# Patient Record
Sex: MALE | Race: WHITE | NOT HISPANIC OR LATINO | Employment: OTHER | URBAN - NONMETROPOLITAN AREA
[De-identification: names, ages, dates, MRNs, and addresses within clinical notes are randomized per-mention and may not be internally consistent; named-entity substitution may affect disease eponyms.]

---

## 2024-10-11 PROBLEM — H11.051: Status: ACTIVE | Noted: 2024-10-11

## 2024-10-14 ENCOUNTER — ANESTHESIA EVENT (OUTPATIENT)
Dept: SURGERY | Facility: HOSPITAL | Age: 69
End: 2024-10-14
Payer: MEDICARE

## 2024-10-14 RX ORDER — AZELASTINE 1 MG/ML
1 SPRAY, METERED NASAL 2 TIMES DAILY
COMMUNITY

## 2024-10-14 RX ORDER — MONTELUKAST SODIUM 10 MG/1
10 TABLET ORAL NIGHTLY
COMMUNITY

## 2024-10-14 RX ORDER — ASPIRIN 81 MG/1
81 TABLET ORAL DAILY
COMMUNITY

## 2024-10-14 RX ORDER — NEBIVOLOL 5 MG/1
10 TABLET ORAL DAILY
COMMUNITY

## 2024-10-14 RX ORDER — LISINOPRIL 10 MG/1
10 TABLET ORAL DAILY
COMMUNITY

## 2024-10-14 RX ORDER — ATORVASTATIN CALCIUM 40 MG/1
40 TABLET, FILM COATED ORAL DAILY
COMMUNITY

## 2024-10-14 NOTE — ANESTHESIA PREPROCEDURE EVALUATION
10/14/2024  Luisito Bell is a 68 y.o., male.      Pre-op Assessment    I have reviewed the Patient Summary Reports.    I have reviewed the NPO Status.   I have reviewed the Medications.     Review of Systems  Anesthesia Hx:  No problems with previous Anesthesia             Denies Family Hx of Anesthesia complications.    Denies Personal Hx of Anesthesia complications.                    Social:  Non-Smoker       Cardiovascular:     Hypertension, well controlled                                          Pulmonary:  Pulmonary Normal                       Renal/:  Renal/ Normal                 Hepatic/GI:  Hepatic/GI Normal                    Neurological:  Neurology Normal                                      Endocrine:  Endocrine Normal                Physical Exam  General: Well nourished    Airway:  Mallampati: II / II  Mouth Opening: Normal  TM Distance: Normal  Tongue: Normal  Neck ROM: Normal ROM    Dental:  Partial Dentures    Chest/Lungs:  Clear to auscultation    Heart:  Rate: Normal  Rhythm: Regular Rhythm  Sounds: Normal        Anesthesia Plan  Type of Anesthesia, risks & benefits discussed:    Anesthesia Type: MAC  Intra-op Monitoring Plan: Standard ASA Monitors  Post Op Pain Control Plan: multimodal analgesia  Induction:  IV  Airway Plan: Direct  Informed Consent: Informed consent signed with the Patient and all parties understand the risks and agree with anesthesia plan.  All questions answered.   ASA Score: 2  Day of Surgery Review of History & Physical: I have interviewed and examined the patient. I have reviewed the patient's H&P dated: There are no significant changes.     Ready For Surgery From Anesthesia Perspective.     .

## 2024-10-15 ENCOUNTER — ANESTHESIA (OUTPATIENT)
Dept: SURGERY | Facility: HOSPITAL | Age: 69
End: 2024-10-15
Payer: MEDICARE

## 2024-10-15 ENCOUNTER — HOSPITAL ENCOUNTER (OUTPATIENT)
Facility: HOSPITAL | Age: 69
Discharge: HOME | End: 2024-10-15
Attending: OPHTHALMOLOGY | Admitting: OPHTHALMOLOGY
Payer: MEDICARE

## 2024-10-15 VITALS
HEART RATE: 70 BPM | HEIGHT: 67 IN | OXYGEN SATURATION: 100 % | BODY MASS INDEX: 27.94 KG/M2 | WEIGHT: 178 LBS | RESPIRATION RATE: 18 BRPM | TEMPERATURE: 98 F | SYSTOLIC BLOOD PRESSURE: 140 MMHG | DIASTOLIC BLOOD PRESSURE: 81 MMHG

## 2024-10-15 DIAGNOSIS — H11.051 PROGRESSIVE PERIPHERAL PTERYGIUM OF RIGHT EYE: ICD-10-CM

## 2024-10-15 PROCEDURE — V2790 AMNIOTIC MEMBRANE: HCPCS | Performed by: OPHTHALMOLOGY

## 2024-10-15 PROCEDURE — 36000707: Performed by: OPHTHALMOLOGY

## 2024-10-15 PROCEDURE — 63600175 PHARM REV CODE 636 W HCPCS: Performed by: NURSE ANESTHETIST, CERTIFIED REGISTERED

## 2024-10-15 PROCEDURE — 25000003 PHARM REV CODE 250: Performed by: OPHTHALMOLOGY

## 2024-10-15 PROCEDURE — 63600175 PHARM REV CODE 636 W HCPCS: Performed by: OPHTHALMOLOGY

## 2024-10-15 PROCEDURE — 36000706: Performed by: OPHTHALMOLOGY

## 2024-10-15 PROCEDURE — 37000009 HC ANESTHESIA EA ADD 15 MINS: Performed by: OPHTHALMOLOGY

## 2024-10-15 PROCEDURE — 25000242 PHARM REV CODE 250 ALT 637 W/ HCPCS: Performed by: ANESTHESIOLOGY

## 2024-10-15 PROCEDURE — 71000015 HC POSTOP RECOV 1ST HR: Performed by: OPHTHALMOLOGY

## 2024-10-15 PROCEDURE — 27201423 OPTIME MED/SURG SUP & DEVICES STERILE SUPPLY: Performed by: OPHTHALMOLOGY

## 2024-10-15 PROCEDURE — 37000008 HC ANESTHESIA 1ST 15 MINUTES: Performed by: OPHTHALMOLOGY

## 2024-10-15 PROCEDURE — 25000003 PHARM REV CODE 250

## 2024-10-15 DEVICE — GRAFT AMNIOGRAFT 2X1.5CM: Type: IMPLANTABLE DEVICE | Site: EYE | Status: FUNCTIONAL

## 2024-10-15 RX ORDER — LIDOCAINE HCL/EPINEPHRINE/PF 2%-1:200K
VIAL (ML) INJECTION
Status: DISCONTINUED | OUTPATIENT
Start: 2024-10-15 | End: 2024-10-15 | Stop reason: HOSPADM

## 2024-10-15 RX ORDER — MOXIFLOXACIN 5 MG/ML
1 SOLUTION/ DROPS OPHTHALMIC
Status: COMPLETED | OUTPATIENT
Start: 2024-10-15 | End: 2024-10-15

## 2024-10-15 RX ORDER — PREDNISOLONE ACETATE 10 MG/ML
1 SUSPENSION/ DROPS OPHTHALMIC ONCE
Status: DISCONTINUED | OUTPATIENT
Start: 2024-10-15 | End: 2024-10-15 | Stop reason: HOSPADM

## 2024-10-15 RX ORDER — LIDOCAINE HYDROCHLORIDE 10 MG/ML
1 INJECTION, SOLUTION EPIDURAL; INFILTRATION; INTRACAUDAL; PERINEURAL ONCE
Status: DISCONTINUED | OUTPATIENT
Start: 2024-10-15 | End: 2024-10-15 | Stop reason: HOSPADM

## 2024-10-15 RX ORDER — TETRACAINE HYDROCHLORIDE 5 MG/ML
SOLUTION OPHTHALMIC
Status: DISCONTINUED | OUTPATIENT
Start: 2024-10-15 | End: 2024-10-15 | Stop reason: HOSPADM

## 2024-10-15 RX ORDER — PROPARACAINE HYDROCHLORIDE 5 MG/ML
SOLUTION/ DROPS OPHTHALMIC
Status: DISCONTINUED | OUTPATIENT
Start: 2024-10-15 | End: 2024-10-15 | Stop reason: HOSPADM

## 2024-10-15 RX ORDER — MOXIFLOXACIN 5 MG/ML
SOLUTION/ DROPS OPHTHALMIC
Status: DISCONTINUED | OUTPATIENT
Start: 2024-10-15 | End: 2024-10-15 | Stop reason: HOSPADM

## 2024-10-15 RX ORDER — NEOMYCIN SULFATE, POLYMYXIN B SULFATE, AND DEXAMETHASONE 3.5; 10000; 1 MG/G; [USP'U]/G; MG/G
OINTMENT OPHTHALMIC
Status: DISCONTINUED | OUTPATIENT
Start: 2024-10-15 | End: 2024-10-15 | Stop reason: HOSPADM

## 2024-10-15 RX ORDER — DEXAMETHASONE SODIUM PHOSPHATE 4 MG/ML
INJECTION, SOLUTION INTRA-ARTICULAR; INTRALESIONAL; INTRAMUSCULAR; INTRAVENOUS; SOFT TISSUE
Status: DISCONTINUED | OUTPATIENT
Start: 2024-10-15 | End: 2024-10-15 | Stop reason: HOSPADM

## 2024-10-15 RX ORDER — DEXAMETHASONE SODIUM PHOSPHATE 4 MG/ML
2 INJECTION, SOLUTION INTRA-ARTICULAR; INTRALESIONAL; INTRAMUSCULAR; INTRAVENOUS; SOFT TISSUE ONCE
Status: DISCONTINUED | OUTPATIENT
Start: 2024-10-15 | End: 2024-10-15 | Stop reason: HOSPADM

## 2024-10-15 RX ORDER — NEOMYCIN SULFATE, POLYMYXIN B SULFATE, AND DEXAMETHASONE 3.5; 10000; 1 MG/G; [USP'U]/G; MG/G
OINTMENT OPHTHALMIC ONCE
Status: DISCONTINUED | OUTPATIENT
Start: 2024-10-15 | End: 2024-10-15 | Stop reason: HOSPADM

## 2024-10-15 RX ORDER — TETRACAINE HYDROCHLORIDE 5 MG/ML
1 SOLUTION OPHTHALMIC
Status: DISPENSED | OUTPATIENT
Start: 2024-10-15 | End: 2024-10-15

## 2024-10-15 RX ORDER — TETRACAINE HYDROCHLORIDE 5 MG/ML
1 SOLUTION OPHTHALMIC
Status: COMPLETED | OUTPATIENT
Start: 2024-10-15 | End: 2024-10-15

## 2024-10-15 RX ORDER — CEFAZOLIN SODIUM 1 G/3ML
INJECTION, POWDER, FOR SOLUTION INTRAMUSCULAR; INTRAVENOUS
Status: DISCONTINUED | OUTPATIENT
Start: 2024-10-15 | End: 2024-10-15 | Stop reason: HOSPADM

## 2024-10-15 RX ORDER — MOXIFLOXACIN 5 MG/ML
1 SOLUTION/ DROPS OPHTHALMIC
Status: ACTIVE | OUTPATIENT
Start: 2024-10-15 | End: 2024-10-15

## 2024-10-15 RX ORDER — PREDNISOLONE ACETATE 10 MG/ML
SUSPENSION/ DROPS OPHTHALMIC
Status: DISCONTINUED | OUTPATIENT
Start: 2024-10-15 | End: 2024-10-15 | Stop reason: HOSPADM

## 2024-10-15 RX ORDER — MIDAZOLAM HYDROCHLORIDE 2 MG/ML
2 SYRUP ORAL ONCE
Status: COMPLETED | OUTPATIENT
Start: 2024-10-15 | End: 2024-10-15

## 2024-10-15 RX ORDER — MIDAZOLAM HYDROCHLORIDE 1 MG/ML
INJECTION INTRAMUSCULAR; INTRAVENOUS
Status: DISCONTINUED | OUTPATIENT
Start: 2024-10-15 | End: 2024-10-15

## 2024-10-15 RX ADMIN — MOXIFLOXACIN 1 DROP: 5 SOLUTION/ DROPS OPHTHALMIC at 11:10

## 2024-10-15 RX ADMIN — TETRACAINE HYDROCHLORIDE 1 DROP: 5 SOLUTION OPHTHALMIC at 11:10

## 2024-10-15 RX ADMIN — TETRACAINE HYDROCHLORIDE 1 DROP: 5 SOLUTION OPHTHALMIC at 12:10

## 2024-10-15 RX ADMIN — MIDAZOLAM HYDROCHLORIDE 2 MG: 2 SYRUP ORAL at 12:10

## 2024-10-15 RX ADMIN — MITOMYCIN 0.2 MG: KIT at 02:10

## 2024-10-15 RX ADMIN — MIDAZOLAM 2 MG: 1 INJECTION INTRAMUSCULAR; INTRAVENOUS at 01:10

## 2024-10-15 NOTE — TRANSFER OF CARE
Anesthesia Transfer of Care Note    Patient: Luisito Bell    Procedure(s) Performed: Procedure(s) (LRB):  EXCISION, PTERYGIUM (Right)    Patient location: Other: OR    Anesthesia Type: MAC    Transport from OR: Transported from OR on room air with adequate spontaneous ventilation    Post pain: adequate analgesia    Post assessment: no apparent anesthetic complications    Post vital signs: stable    Level of consciousness: awake    Nausea/Vomiting: no nausea/vomiting    Complications: none    Transfer of care protocol was followed      Last vitals:   100% O2 SAT  75 HR  162/78  16 RR  37 C TEMP

## 2024-10-15 NOTE — DISCHARGE INSTRUCTIONS
-RELAX AT HOME FOR THE REST OF THE DAY. YOU MAY EAT ANYTHING YOU LIKE.   -PLAN TO SEE DR LOPEZ TOMORROW AT THE OFFICE. BRING ALL EYE DROPS WITH YOU TO THIS APPOINTMENT.    -PLAN TO WEAR EYE SHIELD UNTIL YOUR APPOINTMENT WITH DR LOPEZ    -STARTING THE DAY AFTER SURGERY:    -PUT EYE DROPS/OINTMENT IN THE AFFECTED EYE AS PER DR LOPEZ'S EYE DROP SCHEDULED.     -IF YOU HAVE EXCESSIVE PAIN OR DECREASED VISION, CALL DR LOPEZ'S OFFICE [phone] OR IF AFTER HOURS CALL [phone].

## 2024-10-15 NOTE — OP NOTE
[facility]  Operative Note    Date:  10/15/2024     Patient:  Luisito Bell   MRN:  [id number]   :  1955    Surgeon:  Rian Alexandre MD    PREOPERATIVE DIAGNOSIS:  Visually significant peripheral progressive pterygium, right eye.    POSTOPERATIVE DIAGNOSIS:  Visually significant peripheral progressive pterygium, right eye.    PROCEDURE:  Pterygium removal with Mitomycin C and Amniotic Membrane Transplant, right eye    ANESTHESIA:  Topical, local, MAC.      COMPLICATIONS:  None.    ESTIMATED BLOOD LOSS:  Minimal.    PROCEDURE IN DETAIL:  The patient presented to our clinic complaining of increasing difficulties with activities of daily living due to a visually significant peripheral progressive pterygium in the right eye.  After risks, benefits, and alternatives were explained to the patient, the patient agreed to proceed with the above procedure.  The patient was brought to the operating room and received topical anesthetic to the right eye and then prepped and draped in the usual sterile fashion.  The patient was asked to look the right as the nasal conjunctiva was dried with a Weck-Imelda.  The conjunctival portion of the pterygium was outlined using a sterile marker.  Next, an injection of lidocaine 2% with epinephrine was given subconjunctivally nasally.  0.12 forceps and blunt Matthew scissors were used to dissect out the conjunctival portion of the pterygium.  The corneal portion of the pterygium was then gently pealed off with conjunctival forceps and a dry Weck-Imelda.  The specimen was sent to Pathology.  The nasal cornea was smoothed using a pola emma.  Blunt dissection was performed subconjunctivally and hemostasis was achieved on the episclera using eraser cautery.  3 pledgets soaked in Mitomycin C 0.04% were placed subconjunctivally nasally for 2 minutes exactly, and then removed.  Immediately after this, 3 bottles of BSS were used to thoroughly rinse the area.  Next, the amniotic  membrane was cut to size to fill the conjunctival defect.  The amniotic membrane transplant was placed on the episclera and retracted nasally.  Thrombin and Fibrinogen were placed on the episclera, followed by placement of the amniotic membrane transplant.  The transplant was smoothed using a muscle hook and its edges were tucked subconjunctivally.  An injection of Decadron and Ancef was given subconjunctivally inferiorly.  Vigamox, Pred Forte, as well as Maxitrol ointment were placed in the eye followed by placement of a patch and Holloway shield.  The patient tolerated the procedure well and was taken to the recovery room in good and stable condition.  The patient was instructed to keep the patch and shield on at all times and to follow-up in the morning for routine post-operative care with Dr. Rian Alexandre.

## 2024-10-15 NOTE — ANESTHESIA POSTPROCEDURE EVALUATION
Anesthesia Post Evaluation    Patient: Luisito Bell    Procedure(s) Performed: Procedure(s) (LRB):  EXCISION, PTERYGIUM (Right)    Final Anesthesia Type: MAC      Patient location during evaluation: OPS  Patient participation: Yes- Able to Participate  Level of consciousness: awake  Post-procedure vital signs: reviewed and stable  Pain management: adequate  Airway patency: patent    PONV status at discharge: No PONV  Anesthetic complications: no      Cardiovascular status: blood pressure returned to baseline  Respiratory status: spontaneous ventilation  Hydration status: euvolemic  Follow-up not needed.              Vitals Value Taken Time   /81 10/15/24 1451   Temp 36.8 °C (98.3 °F) 10/15/24 1451   Pulse 70 10/15/24 1451   Resp 18 10/15/24 1451   SpO2 100 % 10/15/24 1451         No case tracking events are documented in the log.      Pain/Haseeb Score: Haseeb Score: 10 (10/15/2024  2:56 PM)

## 2024-10-15 NOTE — DISCHARGE SUMMARY
[facility]  Brief Discharge Note      Patient Name:  Luisito Bell   MRN:  [id number]    :  1955   Admission Date:  10/15/2024   Discharge Date:  10/15/2024   Attending Physician: Rian Alexandre MD     Procedure:  Pterygium Removal with Mitomycin C and Amniotic MembraneTransplant (Right)    OUTCOME: Patient tolerated procedure well without complication and is now ready for discharge.    DISPOSITION: Home or Self Care    FINAL DIAGNOSIS:  Peripheral Progressive Pterygium, Right Eye    FOLLOWUP: 1 day in clinic    DISCHARGE INSTRUCTIONS:  Wear eye shield until your schedule appointment with doctor.

## 2024-10-18 LAB — SPECIMEN TO PATHOLOGY - SURGICAL: NORMAL

## (undated) DEVICE — SKIN MARKER STER DUAL TIP

## (undated) DEVICE — HEMOSTATIC ERASER 18GA CRVD

## (undated) DEVICE — NDL SURE-SNAP HYPO SAF 18GX1.5

## (undated) DEVICE — SPONGE GAUZE 4X4 12 PLY STRL

## (undated) DEVICE — GLOVE BIOGEL ECLIPSE SZ 7.5

## (undated) DEVICE — SYR PLASTIPAK LL 3ML

## (undated) DEVICE — ELECTRODE FOAM 535 TEARDROP

## (undated) DEVICE — CANNULA SUPERSOFT CO2 M AD 7FT

## (undated) DEVICE — KIT CUSTOM BASIC EYE ST / MEA

## (undated) DEVICE — WATER IRR STRL USP PLAS 250ML

## (undated) DEVICE — CORD BIPOLAR SINGLE USE

## (undated) DEVICE — GLOVE SENSICARE PI GRN 6.5

## (undated) DEVICE — SHEILD EYE PROTCT BLUE FLEX

## (undated) DEVICE — NDL REG BEVEL 27GX1/2IN

## (undated) DEVICE — DRAPE OPTHALMIC W/POUCH

## (undated) DEVICE — KIT TISSEEL FIBRIN SEALANT 2ML

## (undated) DEVICE — SYR 1CC TB SG 27GX1/2